# Patient Record
Sex: MALE | Race: WHITE | URBAN - METROPOLITAN AREA
[De-identification: names, ages, dates, MRNs, and addresses within clinical notes are randomized per-mention and may not be internally consistent; named-entity substitution may affect disease eponyms.]

---

## 2022-01-07 ENCOUNTER — OFFICE VISIT (OUTPATIENT)
Dept: URBAN - METROPOLITAN AREA CLINIC 48 | Facility: CLINIC | Age: 67
End: 2022-01-07
Payer: MEDICARE

## 2022-01-07 DIAGNOSIS — H33.021 RETINAL DETACHMENT WITH MULTIPLE BREAKS, RIGHT EYE: Primary | ICD-10-CM

## 2022-01-07 PROCEDURE — 99204 OFFICE O/P NEW MOD 45 MIN: CPT | Performed by: OPHTHALMOLOGY

## 2022-01-07 PROCEDURE — 92134 CPTRZ OPH DX IMG PST SGM RTA: CPT | Performed by: OPHTHALMOLOGY

## 2022-01-07 RX ORDER — OFLOXACIN 3 MG/ML
0.3 % SOLUTION/ DROPS OPHTHALMIC
Qty: 10 | Refills: 0 | Status: ACTIVE
Start: 2022-01-07

## 2022-01-07 RX ORDER — PREDNISOLONE ACETATE 10 MG/ML
1 % SUSPENSION/ DROPS OPHTHALMIC
Qty: 10 | Refills: 2 | Status: ACTIVE
Start: 2022-01-07

## 2022-01-07 ASSESSMENT — INTRAOCULAR PRESSURE
OD: 13
OS: 13

## 2022-01-07 NOTE — IMPRESSION/PLAN
Impression: Retinal detachment with multiple breaks, right eye: H33.021. Plan: OCT ordered and performed today. Discussed diagnosis with patient. The clinical exam is consistent retinal detachment. To reduce the risk of further vision loss, urgent surgical intervention is strongly recommended. Discussed treatment options, the various techniques to repair an RD discussed with patient including scleral buckle, silicone oil and pneumatic retinopexy. In addition altitude with gas bubble were discussed. Recommend sx, after a through discussion of surgical R/B/A. The patient understands the potential risks of sx, including (but not limited to) bleeding, pain, infection, loss of vision, loss of eye and possible need for more sx. The patient also understands that pre detachment visual acuity may not return. The patient elects to proceed with sx OD RL-2 **Discussed poss tomorrow Sx. in [place], Pt unable to travel to [address] [place] [address] this weekend**

## 2022-01-12 ENCOUNTER — SURGERY (OUTPATIENT)
Dept: URBAN - METROPOLITAN AREA SURGERY 26 | Facility: SURGERY | Age: 67
End: 2022-01-12
Payer: MEDICARE

## 2022-01-12 PROCEDURE — 67108 REPAIR DETACHED RETINA: CPT | Performed by: OPHTHALMOLOGY

## 2022-01-13 ENCOUNTER — POST-OPERATIVE VISIT (OUTPATIENT)
Dept: URBAN - METROPOLITAN AREA CLINIC 48 | Facility: CLINIC | Age: 67
End: 2022-01-13
Payer: MEDICARE

## 2022-01-13 PROCEDURE — 99024 POSTOP FOLLOW-UP VISIT: CPT | Performed by: OPHTHALMOLOGY

## 2022-01-13 ASSESSMENT — INTRAOCULAR PRESSURE: OD: 22

## 2022-01-13 NOTE — IMPRESSION/PLAN
Impression: S/P RRD (59918) 25g PPVx/ AFx/ EL/ IL/  LAx C3F8 (18%)/ OD - 1 Day. Encounter for surgical aftercare following surgery on a sense organ  Z48.810. Plan: OD Start Oflox  and PF QID 

RTC 01/28/22 with Dr. Libertad Senior excision w/bx RLL lesion RTC as scheduled  for PO2 OD

## 2022-01-19 ENCOUNTER — POST-OPERATIVE VISIT (OUTPATIENT)
Dept: URBAN - METROPOLITAN AREA CLINIC 48 | Facility: CLINIC | Age: 67
End: 2022-01-19
Payer: MEDICARE

## 2022-01-19 PROCEDURE — 99024 POSTOP FOLLOW-UP VISIT: CPT | Performed by: OPHTHALMOLOGY

## 2022-01-19 ASSESSMENT — INTRAOCULAR PRESSURE
OD: 20
OS: 18

## 2022-01-28 ENCOUNTER — PROCEDURE (OUTPATIENT)
Dept: URBAN - METROPOLITAN AREA CLINIC 48 | Facility: CLINIC | Age: 67
End: 2022-01-28
Payer: MEDICARE

## 2022-01-28 DIAGNOSIS — D23.112 OTHER BENIGN NEOPLASM OF SKIN OF RIGHT LOWER EYELID, INCLUDING CANTHUS: Primary | ICD-10-CM

## 2022-01-28 PROCEDURE — 11440 EXC FACE-MM B9+MARG 0.5 CM/<: CPT | Performed by: OPHTHALMOLOGY

## 2022-01-28 RX ORDER — ERYTHROMYCIN 5 MG/G
OINTMENT OPHTHALMIC
Qty: 1 | Refills: 0 | Status: ACTIVE
Start: 2022-01-28

## 2022-02-02 ENCOUNTER — POST-OPERATIVE VISIT (OUTPATIENT)
Dept: URBAN - METROPOLITAN AREA CLINIC 48 | Facility: CLINIC | Age: 67
End: 2022-02-02
Payer: MEDICARE

## 2022-02-02 PROCEDURE — 99024 POSTOP FOLLOW-UP VISIT: CPT | Performed by: OPHTHALMOLOGY

## 2022-02-02 ASSESSMENT — INTRAOCULAR PRESSURE
OD: 13
OS: 13

## 2022-02-02 NOTE — IMPRESSION/PLAN
Impression: S/P RRD (79664) 25g PPVx/ AFx/ EL/ IL/  LAx C3F8 (18%)/ OD - 21 Days. Encounter for surgical aftercare following surgery on a sense organ  Z48.810.  Plan: --Continue Prednisolone acetate TID OD

## 2022-02-10 ENCOUNTER — OFFICE VISIT (OUTPATIENT)
Dept: URBAN - METROPOLITAN AREA CLINIC 48 | Facility: CLINIC | Age: 67
End: 2022-02-10
Payer: MEDICARE

## 2022-02-10 PROCEDURE — 92012 INTRM OPH EXAM EST PATIENT: CPT | Performed by: OPHTHALMOLOGY

## 2022-02-10 ASSESSMENT — INTRAOCULAR PRESSURE
OS: 13
OD: 10

## 2022-03-09 ENCOUNTER — POST-OPERATIVE VISIT (OUTPATIENT)
Dept: URBAN - METROPOLITAN AREA CLINIC 48 | Facility: CLINIC | Age: 67
End: 2022-03-09
Payer: MEDICARE

## 2022-03-09 DIAGNOSIS — H33.41 TRACTION DETACHMENT OF RETINA, RIGHT EYE: ICD-10-CM

## 2022-03-09 DIAGNOSIS — Z48.810 ENCOUNTER FOR SURGICAL AFTERCARE FOLLOWING SURGERY ON A SENSE ORGAN: Primary | ICD-10-CM

## 2022-03-09 PROCEDURE — 99024 POSTOP FOLLOW-UP VISIT: CPT | Performed by: OPHTHALMOLOGY

## 2022-03-09 ASSESSMENT — INTRAOCULAR PRESSURE
OS: 12
OD: 10

## 2022-03-09 NOTE — IMPRESSION/PLAN
Impression: S/P RRD (95724) 25g PPVx/ AFx/ EL/ IL/  LAx C3F8 (18%)/ OD - 56 Days. Encounter for surgical aftercare following surgery on a sense organ  Z48.810.  Post operative instructions reviewed - Plan: PT. to continue Pred BID OD

## 2022-03-09 NOTE — IMPRESSION/PLAN
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 Plan: OCT ordered and performed today. Discussed diagnosis with patient. The clinical exam is consistent with a macula sparing retinal detachment. To reduce the risk of further vision loss, urgent surgical intervention is strongly recommended. Discussed treatment options, the various techniques to repair an RD discussed with patient including scleral buckle, silicone oil and pneumatic retinopexy. In addition altitude with gas bubble were discussed. Recommend sx, after a through discussion of surgical R/B/A. The patient understands the potential risks of sx, including (but not limited to) bleeding, pain, infection, loss of vision, loss of eye and possible need for more sx. The patient also understands that pre detachment visual acuity may not return.  The patient elects to proceed with sx RL-2

## 2022-03-16 ENCOUNTER — SURGERY (OUTPATIENT)
Dept: URBAN - METROPOLITAN AREA SURGERY 26 | Facility: SURGERY | Age: 67
End: 2022-03-16
Payer: MEDICARE

## 2022-03-16 PROCEDURE — 67113 REPAIR RETINAL DETACH CPLX: CPT | Performed by: OPHTHALMOLOGY

## 2022-03-17 ENCOUNTER — POST-OPERATIVE VISIT (OUTPATIENT)
Dept: URBAN - METROPOLITAN AREA CLINIC 48 | Facility: CLINIC | Age: 67
End: 2022-03-17
Payer: MEDICARE

## 2022-03-17 PROCEDURE — 99024 POSTOP FOLLOW-UP VISIT: CPT | Performed by: STUDENT IN AN ORGANIZED HEALTH CARE EDUCATION/TRAINING PROGRAM

## 2022-03-17 RX ORDER — BRIMONIDINE TARTRATE 2 MG/ML
0.2 % SOLUTION/ DROPS OPHTHALMIC
Qty: 5 | Refills: 0 | Status: ACTIVE
Start: 2022-03-17

## 2022-03-17 ASSESSMENT — INTRAOCULAR PRESSURE: OD: 22

## 2022-03-17 NOTE — IMPRESSION/PLAN
Impression: S/P (98538) RRD/ 25g PPVx/ ICG/ERMx/AFx/ EL/OTTO Oil 5000 inj OD - 1 Day. Encounter for surgical aftercare following surgery on a sense organ  Z48.810. Plan: OD Start Brim BID Continue PF and Oflox.  QID 

RTC as scheduled with Dr. Amee Salas

## 2022-03-23 ENCOUNTER — POST-OPERATIVE VISIT (OUTPATIENT)
Dept: URBAN - METROPOLITAN AREA CLINIC 48 | Facility: CLINIC | Age: 67
End: 2022-03-23
Payer: MEDICARE

## 2022-03-23 PROCEDURE — 99024 POSTOP FOLLOW-UP VISIT: CPT | Performed by: OPHTHALMOLOGY

## 2022-03-23 ASSESSMENT — INTRAOCULAR PRESSURE
OD: 12
OS: 14

## 2022-03-23 NOTE — IMPRESSION/PLAN
Impression: S/P (51584) RRD/ 25g PPVx/ ICG/ERMx/AFx/ EL/OTTO Oil 5000 inj OD - 7 Days. Encounter for surgical aftercare following surgery on a sense organ  Z48.810.  Post operative instructions reviewed - Plan: Pt. to continue Pred QID OD and D/C Brimonidine and Ocuflox

## 2022-04-20 ENCOUNTER — POST-OPERATIVE VISIT (OUTPATIENT)
Dept: URBAN - METROPOLITAN AREA CLINIC 48 | Facility: CLINIC | Age: 67
End: 2022-04-20
Payer: MEDICARE

## 2022-04-20 DIAGNOSIS — Z48.810 ENCOUNTER FOR SURGICAL AFTERCARE FOLLOWING SURGERY ON A SENSE ORGAN: Primary | ICD-10-CM

## 2022-04-20 PROCEDURE — 92134 CPTRZ OPH DX IMG PST SGM RTA: CPT | Performed by: OPHTHALMOLOGY

## 2022-04-20 PROCEDURE — 99024 POSTOP FOLLOW-UP VISIT: CPT | Performed by: OPHTHALMOLOGY

## 2022-04-20 ASSESSMENT — INTRAOCULAR PRESSURE
OD: 13
OS: 13

## 2022-04-20 NOTE — IMPRESSION/PLAN
Impression: S/P (58890) RRD/ 25g PPVx/ ICG/ERMx/AFx/ EL/OTTO Oil 5000 inj OD - 35 Days. Encounter for surgical aftercare following surgery on a sense organ  Z48.810. Plan: Retina appears flat and attached
some inflammation still present Continue: Pred QID OD Patient will need silicone oil removed 6mo post RRD, and suture lens RTC May 3rd [place]/OCT
will likely have Dr. Liliana Champagne remove silicone oil and suture lens next fall

## 2022-05-03 ENCOUNTER — POST-OPERATIVE VISIT (OUTPATIENT)
Dept: URBAN - METROPOLITAN AREA CLINIC 48 | Facility: CLINIC | Age: 67
End: 2022-05-03
Payer: MEDICARE

## 2022-05-03 DIAGNOSIS — Z48.810 ENCOUNTER FOR SURGICAL AFTERCARE FOLLOWING SURGERY ON A SENSE ORGAN: Primary | ICD-10-CM

## 2022-05-03 PROCEDURE — 92134 CPTRZ OPH DX IMG PST SGM RTA: CPT | Performed by: OPHTHALMOLOGY

## 2022-05-03 PROCEDURE — 99024 POSTOP FOLLOW-UP VISIT: CPT | Performed by: OPHTHALMOLOGY

## 2022-05-03 ASSESSMENT — INTRAOCULAR PRESSURE
OD: 19
OS: 12

## 2022-05-03 NOTE — IMPRESSION/PLAN
Impression: S/P (97160) RRD/ 25g PPVx/ ICG/ERMx/AFx/ EL/OTTO Oil 5000 inj OD - 48 Days. Encounter for surgical aftercare following surgery on a sense organ  Z48.810. Post operative instructions reviewed - Condition is improving -

Discussed silicone oil will not be removed until several months . Continue: PF Tid OD Plan: RTC early June 1st or 2nd week in June.   


Appointment with Dr. Gerry Buck in August or September

## 2022-06-07 ENCOUNTER — POST-OPERATIVE VISIT (OUTPATIENT)
Dept: URBAN - METROPOLITAN AREA CLINIC 48 | Facility: CLINIC | Age: 67
End: 2022-06-07
Payer: MEDICARE

## 2022-06-07 DIAGNOSIS — H35.371 PUCKERING OF MACULA, RIGHT EYE: ICD-10-CM

## 2022-06-07 DIAGNOSIS — Z48.810 ENCOUNTER FOR SURGICAL AFTERCARE FOLLOWING SURGERY ON A SENSE ORGAN: Primary | ICD-10-CM

## 2022-06-07 PROCEDURE — 99024 POSTOP FOLLOW-UP VISIT: CPT | Performed by: OPHTHALMOLOGY

## 2022-06-07 PROCEDURE — 92134 CPTRZ OPH DX IMG PST SGM RTA: CPT | Performed by: OPHTHALMOLOGY

## 2022-06-07 ASSESSMENT — INTRAOCULAR PRESSURE
OD: 14
OS: 13

## 2022-07-11 ENCOUNTER — OFFICE VISIT (OUTPATIENT)
Dept: URBAN - METROPOLITAN AREA CLINIC 32 | Facility: CLINIC | Age: 67
End: 2022-07-11
Payer: MEDICARE

## 2022-07-11 DIAGNOSIS — H33.021 RETINAL DETACHMENT WITH MULTIPLE BREAKS, RIGHT EYE: Primary | ICD-10-CM

## 2022-07-11 DIAGNOSIS — H11.441 CONJUNCTIVAL CYSTS, RIGHT EYE: ICD-10-CM

## 2022-07-11 PROCEDURE — 92134 CPTRZ OPH DX IMG PST SGM RTA: CPT | Performed by: OPHTHALMOLOGY

## 2022-07-11 PROCEDURE — 99214 OFFICE O/P EST MOD 30 MIN: CPT | Performed by: OPHTHALMOLOGY

## 2022-07-11 ASSESSMENT — INTRAOCULAR PRESSURE
OD: 24
OS: 13

## 2022-07-11 NOTE — IMPRESSION/PLAN
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 Right. Condition: established, stable. Vision: vision not affected. Plan: Discussed diagnosis in detail with patient. Discussed risks of progression. Recommend surgery OD - see notes above.

## 2022-07-11 NOTE — IMPRESSION/PLAN
Impression: Retinal detachment with multiple breaks, right eye: H33.021. Right. Condition: established, stable. Vision: vision affected. s/p PPV OD 1-12-22 w/ Dr Maria Luisa Millan s/p PPV w/ S.O. OD 3-16-22 w/ Dr Gómez Paci: Discussed diagnosis in detail with patient. Discussed risks of progression. Surgical treatment is recommended to remove the silicone oil PPVx OD. Advised patient that once the Oil is removed if the retina is weak may need to put oil back into the eye (NO Gas bubble due to patient living in [place] an the elevation changes). Do not recommend IOL implant at this time, recommend waiting for the Oil to be removed and let the eye heal completely and then will reassess the potential of TSPCIOL implant. Will also remove small conjunctival cysts on the right eye. Surgical risks and benefits were discussed, explained and understood by patient. All questions answered. RL1. Patient elects to proceed with recommendation.  
OCT stable appearing OU

## 2022-08-11 ENCOUNTER — POST-OPERATIVE VISIT (OUTPATIENT)
Dept: URBAN - METROPOLITAN AREA CLINIC 33 | Facility: CLINIC | Age: 67
End: 2022-08-11
Payer: MEDICARE

## 2022-08-11 DIAGNOSIS — Z48.810 ENCOUNTER FOR SURGICAL AFTERCARE FOLLOWING SURGERY ON A SENSE ORGAN: Primary | ICD-10-CM

## 2022-08-11 PROCEDURE — 99024 POSTOP FOLLOW-UP VISIT: CPT | Performed by: OPTOMETRIST

## 2022-08-11 RX ORDER — PREDNISOLONE ACETATE 10 MG/ML
1 % SUSPENSION/ DROPS OPHTHALMIC
Qty: 10 | Refills: 2 | Status: ACTIVE
Start: 2022-08-11

## 2022-08-11 RX ORDER — OFLOXACIN 3 MG/ML
0.3 % SOLUTION/ DROPS OPHTHALMIC
Qty: 1 | Refills: 0 | Status: ACTIVE
Start: 2022-08-11

## 2022-08-11 ASSESSMENT — INTRAOCULAR PRESSURE
OD: 8
OS: 11

## 2022-08-11 NOTE — IMPRESSION/PLAN
Impression: S/P 88418; PPVX 25G; AFX (Air Fluid Gas Exchange); Silicone Oil Removal; Intravitreal Injection of Kenalog 40 ; Conjunctivoplasty OD - 1 Day. Encounter for surgical aftercare following surgery on a sense organ  Z48.810. Post operative instructions reviewed. Patient denies pain at this time. Start drops as directed. Keep PO2 with Dr. Gibson Greenfield.  Plan: --Continue Ofloxacin 0.3% QID
--Continue Prednisolone QID

## 2022-08-22 ENCOUNTER — POST-OPERATIVE VISIT (OUTPATIENT)
Dept: URBAN - METROPOLITAN AREA CLINIC 32 | Facility: CLINIC | Age: 67
End: 2022-08-22
Payer: MEDICARE

## 2022-08-22 DIAGNOSIS — Z48.810 ENCOUNTER FOR SURGICAL AFTERCARE FOLLOWING SURGERY ON A SENSE ORGAN: Primary | ICD-10-CM

## 2022-08-22 PROCEDURE — 99024 POSTOP FOLLOW-UP VISIT: CPT | Performed by: OPHTHALMOLOGY

## 2022-08-22 ASSESSMENT — INTRAOCULAR PRESSURE
OS: 17
OD: 17

## 2022-08-22 NOTE — IMPRESSION/PLAN
Impression: S/P 87574; PPVX 25G; AFX (Air Fluid Gas Exchange); Silicone Oil Removal; Intravitreal Injection of Kenalog 40 ; Conjunctivoplasty OD - 12 Days. Encounter for surgical aftercare following surgery on a sense organ  Z48.810. Excellent post op course   Post operative instructions reviewed - Condition is improving - Plan: Exam OD shows the retina is attached, OCT OD appears stable and optos OD shows the retina is in good position. Continue PF OD QID until gone, d/c Ofloxacin. Recommend a retina f/u in 1 month. OK to swim in 1 - 2 wks and can now play golf. Patient asked about a Lens Implant. Recommend to hold off 3 - 6 mos before considering a Lens Implant.

## 2022-09-19 ENCOUNTER — POST-OPERATIVE VISIT (OUTPATIENT)
Dept: URBAN - METROPOLITAN AREA CLINIC 32 | Facility: CLINIC | Age: 67
End: 2022-09-19
Payer: MEDICARE

## 2022-09-19 DIAGNOSIS — Z48.810 ENCOUNTER FOR SURGICAL AFTERCARE FOLLOWING SURGERY ON A SENSE ORGAN: Primary | ICD-10-CM

## 2022-09-19 PROCEDURE — 99024 POSTOP FOLLOW-UP VISIT: CPT | Performed by: OPHTHALMOLOGY

## 2022-09-19 ASSESSMENT — INTRAOCULAR PRESSURE
OD: 6
OS: 6

## 2022-11-14 ENCOUNTER — OFFICE VISIT (OUTPATIENT)
Dept: URBAN - METROPOLITAN AREA CLINIC 32 | Facility: CLINIC | Age: 67
End: 2022-11-14
Payer: MEDICARE

## 2022-11-14 DIAGNOSIS — H33.021 RETINAL DETACHMENT WITH MULTIPLE BREAKS, RIGHT EYE: ICD-10-CM

## 2022-11-14 DIAGNOSIS — H27.01 APHAKIA, RIGHT EYE: Primary | ICD-10-CM

## 2022-11-14 DIAGNOSIS — H35.351 CYSTOID MACULAR DEGENERATION, RIGHT EYE: ICD-10-CM

## 2022-11-14 PROCEDURE — 99213 OFFICE O/P EST LOW 20 MIN: CPT | Performed by: OPHTHALMOLOGY

## 2022-11-14 PROCEDURE — 92134 CPTRZ OPH DX IMG PST SGM RTA: CPT | Performed by: OPHTHALMOLOGY

## 2022-11-14 RX ORDER — KETOROLAC TROMETHAMINE 5 MG/ML
0.5 % SOLUTION OPHTHALMIC
Qty: 10 | Refills: 5 | Status: ACTIVE
Start: 2022-11-14

## 2022-11-14 ASSESSMENT — INTRAOCULAR PRESSURE
OD: 9
OS: 12

## 2022-11-14 NOTE — IMPRESSION/PLAN
Impression: Aphakia, right eye: H27.01. Right. Condition: established, stable. Vision: vision affected. s/p PPVX 25G; AFX; S.O. Removal; Kenalog; Conjunctivoplasty OD 08/10/22 w/ Dr. Cheko Zuluaga
s/p RRD/ 25g PPVx/ ICG/ERMx/AFx/ EL/OTTO Oil 5000 inj OD 03/16/22 w/ Dr. Lani Larose s/p RRD 25g PPVx/ AFx/ EL/ IL/  LAx C3F8 (18%) OD 01/12/22 w/ Dr. Riya Key: Discussed diagnosis in detail with patient. Discussed treatment options with patient. No treatment is required at this time. Recommend a diagnostic aphakia refraction with Optometrist in [place]. If improvement with refraction, consider secondary lens implant with Dr. Cheko Zuluaga. Will continue to observe condition and or symptoms.  Return with Dr. Cheko Zuluaga after refraction w/ Optometrist.

## 2022-11-14 NOTE — IMPRESSION/PLAN
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 Right. Condition: unstable. Vision: vision affected. Plan: Discussed diagnosis in detail with patient. Exam OD shows minimal ILM change, mild CME, minimal fluid. Discussed treatment options with patient. No treatment is required at this time. OCT OD shows mild CME. Recommend starting Ketorolac QID OD to help reduce edema. Will continue to observe condition and or symptoms.

## 2022-11-14 NOTE — IMPRESSION/PLAN
Impression: Retinal detachment with multiple breaks, right eye: H33.021. Right. Condition: established, stable. Vision: vision affected. s/p PPVX 25G; AFX; S.O. Removal; ERIN OD 08/10/22 w/ Dr. Gerry Buck
s/p PPV OD 1-12-22 w/ Dr. Agustín Acosta s/p PPV w/ S.O. OD 3-16-22 w/ Dr. Babs Petty: Discussed diagnosis in detail with patient. Discussed risks of progression - see notes above.

## 2023-05-02 ENCOUNTER — OFFICE VISIT (OUTPATIENT)
Dept: URBAN - METROPOLITAN AREA CLINIC 23 | Facility: CLINIC | Age: 68
End: 2023-05-02
Payer: MEDICARE

## 2023-05-02 DIAGNOSIS — H27.01 APHAKIA, RIGHT EYE: Primary | ICD-10-CM

## 2023-05-02 PROCEDURE — 99214 OFFICE O/P EST MOD 30 MIN: CPT | Performed by: OPHTHALMOLOGY

## 2023-05-02 RX ORDER — PREDNISOLONE ACETATE 10 MG/ML
1 % SUSPENSION/ DROPS OPHTHALMIC
Qty: 10 | Refills: 3 | Status: ACTIVE
Start: 2023-05-02

## 2023-05-02 RX ORDER — OFLOXACIN 3 MG/ML
0.3 % SOLUTION/ DROPS OPHTHALMIC
Qty: 5 | Refills: 1 | Status: ACTIVE
Start: 2023-05-02

## 2023-05-02 ASSESSMENT — INTRAOCULAR PRESSURE
OD: 12
OS: 12

## 2023-05-02 NOTE — IMPRESSION/PLAN
Impression: Aphakia, right eye: H27.01. Right. Condition: established, stable. Vision: vision affected. s/p PPVX 25G; AFX; S.O. Removal; Kenalog; Conjunctivoplasty OD 08/10/22 w/ Dr. Ian Villavicencio
s/p RRD/ 25g PPVx/ ICG/ERMx/AFx/ EL/OTTO Oil 5000 inj OD 03/16/22 w/ Dr. Amee Salas s/p RRD 25g PPVx/ AFx/ EL/ IL/  LAx C3F8 (18%) OD 01/12/22 w/ Dr. Gloria Montoya: Discussed diagnosis in detail with patient. Patient had an Aphakic refraction with Dr Hinojosa Delay: Lesvia Diaz with Refraction.  (+11.00 OD). Based on findings recommend surgery - lens Implant for possible vision improvement. Discussed surgery in great detail with risks and benefits. Patient elects to proceed with surgery OD. Recommend PPVx, ERMx, TSPCIOL, Kenalog RIGHT EYE. Will need to schedule A -Scan, patient can do follow-ups in [place]. Erx Prednisolone and Ofloxacin to patient's pharmacy. RL1.

## 2023-06-01 ENCOUNTER — POST-OPERATIVE VISIT (OUTPATIENT)
Dept: URBAN - METROPOLITAN AREA CLINIC 33 | Facility: CLINIC | Age: 68
End: 2023-06-01
Payer: MEDICARE

## 2023-06-01 DIAGNOSIS — Z48.810 ENCOUNTER FOR SURGICAL AFTERCARE FOLLOWING SURGERY ON A SENSE ORGAN: Primary | ICD-10-CM

## 2023-06-01 PROCEDURE — 99024 POSTOP FOLLOW-UP VISIT: CPT | Performed by: OPTOMETRIST

## 2023-06-01 ASSESSMENT — INTRAOCULAR PRESSURE
OD: 5
OS: 13

## 2023-06-01 NOTE — IMPRESSION/PLAN
Impression: S/P 25ga PPVx; Epiretinal/Subfoveal Membrane Stripping; Transcleral Suture Posterior Chamber Intraocular Lens Implant OD - 1 Day. Encounter for surgical aftercare following surgery on a sense organ  Z48.810. Plan: Advised patient vision should continue to improve but will need glasses for best corrected vision. Continue care with Dr. Gudelia Quintana. Continue Prednisolone QID OD Continue Ofloxacin QID OD

## 2023-06-08 ENCOUNTER — POST-OPERATIVE VISIT (OUTPATIENT)
Dept: URBAN - METROPOLITAN AREA CLINIC 33 | Facility: CLINIC | Age: 68
End: 2023-06-08
Payer: MEDICARE

## 2023-06-08 DIAGNOSIS — Z48.810 ENCOUNTER FOR SURGICAL AFTERCARE FOLLOWING SURGERY ON A SENSE ORGAN: Primary | ICD-10-CM

## 2023-06-08 PROCEDURE — 99024 POSTOP FOLLOW-UP VISIT: CPT | Performed by: OPHTHALMOLOGY

## 2023-06-08 RX ORDER — PREDNISOLONE ACETATE 10 MG/ML
1 % SUSPENSION/ DROPS OPHTHALMIC
Qty: 10 | Refills: 3 | Status: ACTIVE
Start: 2023-06-08

## 2023-06-08 ASSESSMENT — INTRAOCULAR PRESSURE
OS: 12
OD: 8

## 2023-06-08 NOTE — IMPRESSION/PLAN
Impression: S/P 25ga PPVx; Epiretinal/Subfoveal Membrane Stripping; Transcleral Suture Posterior Chamber Intraocular Lens Implant OD - 8 Days. Encounter for surgical aftercare following surgery on a sense organ  Z48.810. Plan: Exam, Optos and OCT shows edema on the cornea and the retina. Recommend for patient to increase Prednisolone from QID to 8x daily w/a. Pt may increase his activity and is able to bend over. Make sure hands are completely clean before touching the eye or his drop bottle. Pt may shower, but no swimming pool for another week. Will reassess in 3-4 weeks.

## 2023-07-06 ENCOUNTER — POST-OPERATIVE VISIT (OUTPATIENT)
Dept: URBAN - METROPOLITAN AREA CLINIC 33 | Facility: CLINIC | Age: 68
End: 2023-07-06
Payer: MEDICARE

## 2023-07-06 DIAGNOSIS — Z48.810 ENCOUNTER FOR SURGICAL AFTERCARE FOLLOWING SURGERY ON A SENSE ORGAN: Primary | ICD-10-CM

## 2023-07-06 PROCEDURE — 99024 POSTOP FOLLOW-UP VISIT: CPT | Performed by: OPHTHALMOLOGY

## 2023-07-06 ASSESSMENT — INTRAOCULAR PRESSURE
OS: 12
OD: 8

## 2023-07-06 NOTE — IMPRESSION/PLAN
Impression: S/P 25ga PPVx; Epiretinal/Subfoveal Membrane Stripping; Transcleral Suture Posterior Chamber Intraocular Lens Implant OD - 36 Days. Encounter for surgical aftercare following surgery on a sense organ  Z48.810. Plan: Exam OD shows the TSPCIOL to be in good position. OCT and Optos OD shows stable appearing. Anterior exam OD shows a couple sutures. After 1 drop of Proparacaine was given, suture was removed without complications. Pt may follow up with Dr Sallie Duckworth in [place] in 6 weeks. Continue eye care w/ Dr Sallie Duckworth in [place], return to me if needed.

## 2023-08-17 ENCOUNTER — POST-OPERATIVE VISIT (OUTPATIENT)
Dept: URBAN - METROPOLITAN AREA CLINIC 48 | Facility: CLINIC | Age: 68
End: 2023-08-17
Payer: MEDICARE

## 2023-08-17 DIAGNOSIS — Z48.810 ENCOUNTER FOR SURGICAL AFTERCARE FOLLOWING SURGERY ON A SENSE ORGAN: Primary | ICD-10-CM

## 2023-08-17 PROCEDURE — 92134 CPTRZ OPH DX IMG PST SGM RTA: CPT | Performed by: OPHTHALMOLOGY

## 2023-08-17 PROCEDURE — 99024 POSTOP FOLLOW-UP VISIT: CPT | Performed by: OPHTHALMOLOGY

## 2023-08-17 RX ORDER — PREDNISOLONE ACETATE 10 MG/ML
1 % SUSPENSION/ DROPS OPHTHALMIC
Qty: 10 | Refills: 1 | Status: ACTIVE
Start: 2023-08-17

## 2023-08-17 ASSESSMENT — INTRAOCULAR PRESSURE: OD: 6

## 2023-10-11 ENCOUNTER — OFFICE VISIT (OUTPATIENT)
Dept: URBAN - METROPOLITAN AREA CLINIC 48 | Facility: CLINIC | Age: 68
End: 2023-10-11
Payer: MEDICARE

## 2023-10-11 DIAGNOSIS — H35.371 PUCKERING OF MACULA, RIGHT EYE: Primary | ICD-10-CM

## 2023-10-11 DIAGNOSIS — T85.29XD OTHER MECHANICAL COMPLICATION OF INTRAOCULAR LENS, SUBSEQUENT ENCOUNTER: ICD-10-CM

## 2023-10-11 DIAGNOSIS — H43.822 VITREOMACULAR TRACTION OF LEFT EYE: ICD-10-CM

## 2023-10-11 PROCEDURE — 92134 CPTRZ OPH DX IMG PST SGM RTA: CPT | Performed by: OPHTHALMOLOGY

## 2023-10-11 PROCEDURE — 99214 OFFICE O/P EST MOD 30 MIN: CPT | Performed by: OPHTHALMOLOGY

## 2023-10-11 ASSESSMENT — INTRAOCULAR PRESSURE
OS: 12
OD: 10

## 2023-10-11 ASSESSMENT — VISUAL ACUITY
OS: 20/20
OD: 20/40

## 2023-12-13 ENCOUNTER — OFFICE VISIT (OUTPATIENT)
Facility: LOCATION | Age: 68
End: 2023-12-13
Payer: MEDICARE

## 2023-12-13 DIAGNOSIS — H59.811 CHORIORETINAL SCARS AFTER SURGERY FOR DETACHMENT, RIGHT EYE: ICD-10-CM

## 2023-12-13 DIAGNOSIS — H35.351 CYSTOID MACULAR DEGENERATION, RIGHT EYE: ICD-10-CM

## 2023-12-13 DIAGNOSIS — T85.22XA DISPLACEMENT OF INTRAOCULAR LENS, INITIAL ENCOUNTER: ICD-10-CM

## 2023-12-13 DIAGNOSIS — H25.12 AGE-RELATED NUCLEAR CATARACT, LEFT EYE: ICD-10-CM

## 2023-12-13 DIAGNOSIS — H35.371 PUCKERING OF MACULA, RIGHT EYE: Primary | ICD-10-CM

## 2023-12-13 PROCEDURE — 92134 CPTRZ OPH DX IMG PST SGM RTA: CPT | Performed by: OPHTHALMOLOGY

## 2023-12-13 PROCEDURE — 99204 OFFICE O/P NEW MOD 45 MIN: CPT | Performed by: OPHTHALMOLOGY

## 2023-12-13 ASSESSMENT — INTRAOCULAR PRESSURE
OD: 13
OS: 13

## 2024-03-06 ENCOUNTER — OFFICE VISIT (OUTPATIENT)
Facility: LOCATION | Age: 69
End: 2024-03-06
Payer: MEDICARE

## 2024-03-06 DIAGNOSIS — T85.22XA DISPLACEMENT OF INTRAOCULAR LENS, INITIAL ENCOUNTER: ICD-10-CM

## 2024-03-06 DIAGNOSIS — H35.371 PUCKERING OF MACULA, RIGHT EYE: Primary | ICD-10-CM

## 2024-03-06 DIAGNOSIS — H59.811 CHORIORETINAL SCARS AFTER SURGERY FOR DETACHMENT, RIGHT EYE: ICD-10-CM

## 2024-03-06 DIAGNOSIS — H25.12 AGE-RELATED NUCLEAR CATARACT, LEFT EYE: ICD-10-CM

## 2024-03-06 DIAGNOSIS — H35.351 CYSTOID MACULAR DEGENERATION, RIGHT EYE: ICD-10-CM

## 2024-03-06 PROCEDURE — 92134 CPTRZ OPH DX IMG PST SGM RTA: CPT | Performed by: OPHTHALMOLOGY

## 2024-03-06 PROCEDURE — 99213 OFFICE O/P EST LOW 20 MIN: CPT | Performed by: OPHTHALMOLOGY

## 2024-03-06 ASSESSMENT — INTRAOCULAR PRESSURE
OD: 10
OS: 16

## 2024-07-18 NOTE — IMPRESSION/PLAN
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
bx results: benign squamous papilloma Plan: Bx reviewed and discussed with pt.  


RTC as scheduled with Dr. Ryley Alatorre and Sept. with Dr. Concepcion Adams for [place] w/OCT MAC
Detail Level: Detailed